# Patient Record
Sex: MALE | Race: WHITE | NOT HISPANIC OR LATINO | Employment: UNEMPLOYED | URBAN - METROPOLITAN AREA
[De-identification: names, ages, dates, MRNs, and addresses within clinical notes are randomized per-mention and may not be internally consistent; named-entity substitution may affect disease eponyms.]

---

## 2019-01-01 ENCOUNTER — HOSPITAL ENCOUNTER (EMERGENCY)
Facility: HOSPITAL | Age: 34
End: 2019-05-20
Attending: EMERGENCY MEDICINE

## 2019-01-01 VITALS — BODY MASS INDEX: 32.73 KG/M2 | WEIGHT: 255 LBS | HEIGHT: 74 IN

## 2019-01-01 DIAGNOSIS — R73.9 HYPERGLYCEMIA: ICD-10-CM

## 2019-01-01 DIAGNOSIS — I46.9 ASYSTOLE: Primary | ICD-10-CM

## 2019-01-01 PROCEDURE — 63600175 PHARM REV CODE 636 W HCPCS: Performed by: EMERGENCY MEDICINE

## 2019-01-01 PROCEDURE — 99284 EMERGENCY DEPT VISIT MOD MDM: CPT | Mod: 25

## 2019-01-01 PROCEDURE — 99900035 HC TECH TIME PER 15 MIN (STAT)

## 2019-01-01 PROCEDURE — 96375 TX/PRO/DX INJ NEW DRUG ADDON: CPT

## 2019-01-01 PROCEDURE — 63600175 PHARM REV CODE 636 W HCPCS

## 2019-01-01 PROCEDURE — 96374 THER/PROPH/DIAG INJ IV PUSH: CPT

## 2019-01-01 RX ORDER — EPINEPHRINE 0.1 MG/ML
INJECTION INTRAVENOUS CODE/TRAUMA/SEDATION MEDICATION
Status: COMPLETED | OUTPATIENT
Start: 2019-01-01 | End: 2019-01-01

## 2019-01-01 RX ORDER — NALOXONE HYDROCHLORIDE 1 MG/ML
INJECTION INTRAMUSCULAR; INTRAVENOUS; SUBCUTANEOUS CODE/TRAUMA/SEDATION MEDICATION
Status: COMPLETED | OUTPATIENT
Start: 2019-01-01 | End: 2019-01-01

## 2019-01-01 RX ADMIN — EPINEPHRINE 0.1 MG: 0.1 INJECTION, SOLUTION ENDOTRACHEAL; INTRACARDIAC; INTRAVENOUS at 01:05

## 2019-01-01 RX ADMIN — NALOXONE HYDROCHLORIDE 2 MG: 1 INJECTION PARENTERAL at 01:05

## 2019-05-20 NOTE — ED PROVIDER NOTES
SCRIBE #1 NOTE: I, Gretchen Suarez, am scribing for, and in the presence of, Darleen Perera DO. I have scribed the entire note.       History     Chief Complaint   Patient presents with    Cardiac Arrest     Review of patient's allergies indicates:  No Known Allergies      History of Present Illness     HPI    5/20/2019, 1:05 PM  History obtained from EMS and mother  HPI limited due to pt's acuity of condition      History of Present Illness: Mejia Balderas is a 33 y.o. male patient who presents to the Emergency Department for evaluation of cardiac arrest which suddenly onset 1 hour pta. EMS reports a witnessed fall prior to episode of cardiac arrest. En route, pt was given 7 epis, 4 mg of Levophed, and 1 bicarbonate. CPR was continued upon arrival. EMS notes 1 hour of CPR with 5 minutes between, prior to arrival. Per mother, pt was dx with double PNA 3 days ago and prescribed abx. She reports pt had difficulty breathing throughout the weekend with no relief from inhalers. Earlier today, she reports pt became SOB and suddenly collapsed. Family immediately began CPR on site.        Arrival mode: EMS    PCP: Alexys Palomino MD        Past Medical History:  Pneumonia.    Past Surgical History:  History reviewed. No pertinent history.       Family History:  History reviewed. No pertinent history.     Social History:  Positive tobacco use.  No alcohol or drugs.     Review of Systems     Review of Systems   Unable to perform ROS: Acuity of condition   Respiratory: Positive for shortness of breath.       Physical Exam     Initial Vitals [05/20/19 1321]   BP Pulse Resp Temp SpO2   -- (!) 0 -- -- --      MAP       --          Physical Exam  Nursing Notes and Vital Signs Reviewed.  Physical exam is limited due to the patient's condition.   General: Patient is unresponsive to pain and all other stimuli.   Head: Atraumatic   Eyes: Pupils are 7 mm and non reactive.   ENT: Airway patent. Dequan tube in  "place.  Cardiovascular: Heart sounds are absent. Pulses are absent. Bedside US performed. No cardiac activity.  Respiratory: No spontaneous respirations. Equal breath sounds with controlled ventilation. Rales.   Abdominal: No distension. Obese.  Musculoskeletal: No deformities. IO to R humerus.   Skin: Pale. Livedo reticularis. 3 cm ulcer to L foot. 1.5 cm dulcer to R foot.   Neurological: Full neuro exam limited due to patient's condition.  GCS 3 P     ED Course   Critical Care  Date/Time: 5/20/2019 2:04 PM  Performed by: Darleen Perera DO  Authorized by: Darleen Perera DO   Direct patient critical care time: 10 minutes  Additional history critical care time: 2 minutes  Ordering / reviewing critical care time: 1 minutes  Documentation critical care time: 5 minutes  Consult with family critical care time: 2 minutes  Total critical care time (exclusive of procedural time) : 20 minutes  Critical care time was exclusive of separately billable procedures and treating other patients and teaching time.  Critical care was necessary to treat or prevent imminent or life-threatening deterioration of the following conditions: cardiac failure.  Critical care was time spent personally by me on the following activities: development of treatment plan with patient or surrogate, evaluation of patient's response to treatment, examination of patient, obtaining history from patient or surrogate, pulse oximetry, re-evaluation of patient's condition and review of old charts.        ED Vital Signs:  Vitals:    05/20/19 1321   Pulse: (!) 0   Weight: 115.7 kg (255 lb)   Height: 6' 2" (1.88 m)       Abnormal Lab Results:  Labs Reviewed - No data to display       Imaging Results:  Imaging Results    None                 The Emergency Provider reviewed the vital signs and test results, which are outlined above.     ED Discussion     1:07 PM: Pulse check. No pulse.    1:09 PM: 2 mg of Narcan pushed.    1:11 PM: Pulse check. No " "pulse.    1:11 PM: CPR resumed.    1:12 PM: Epi pushed.    1:14 PM: Pulse check. No pulse.    1:14 PM: Time of death called. Pt .    1:25 PM: Spoke with pt's family. Pt's family reports that the pt was recently dx with "double PNA" and started on abx 3 days ago. Pt was SOB over the weekend and had no relief with his home inhalers. Today, the pt became SOB and suddenly collapsed. Family immediately began CPR and called 911. CPR was continued until EMS arrived. D/w pt's family pt condition. Addressed all questions. Pt's family has no concerns or further questions at this time.    ED Medication(s):  Medications   naloxone injection (2 mg Injection Given 19 1309)   EPINEPHrine 0.1 mg/mL injection (0.1 mg Intravenous Given 19 1312)       There are no discharge medications for this patient.                            Scribe Attestation:   Scribe #1: I performed the above scribed service and the documentation accurately describes the services I performed. I attest to the accuracy of the note.     Attending:   Physician Attestation Statement for Scribe #1: I, Darleen Perera DO, personally performed the services described in this documentation, as scribed by Gretchen Suarez, in my presence, and it is both accurate and complete.           Clinical Impression       ICD-10-CM ICD-9-CM   1. Asystole I46.9 427.5   2. Hyperglycemia R73.9 790.29       Disposition:   Disposition:          Darleen Perera DO  19 [id number]    "

## 2019-05-20 NOTE — PROGRESS NOTES
EMS arrived CPR  In progress. Combi tube was place while out in field.. RT ambued with 100 % FiO2 . physician at  . PT was pronounced  By DR. Baltazar at 1505

## 2019-05-20 NOTE — ED NOTES
Jasmin from [facility] called back and states to release patient to the [facility] as he is a candidate for tissue. Family selected [facility].

## 2019-05-20 NOTE — ED NOTES
"EMS arrived with CPR in progress. States patient fell and while crawling to get help "went out". CPR was started about 5 minutes later by Fire department and continued by EMS. pulse never regained, remained in PEA. Total of 7 epi given, amp bicarb, fluid and 4 mcg Levo drip. IO to right Humeral head and 18 gauge to left FA. Dequan Tube in place, placed by frie department. Dr Perera at the bedside.    "